# Patient Record
Sex: MALE | Race: WHITE | NOT HISPANIC OR LATINO | Employment: OTHER | URBAN - METROPOLITAN AREA
[De-identification: names, ages, dates, MRNs, and addresses within clinical notes are randomized per-mention and may not be internally consistent; named-entity substitution may affect disease eponyms.]

---

## 2020-05-28 ENCOUNTER — TELEPHONE (OUTPATIENT)
Dept: UROLOGY | Facility: CLINIC | Age: 85
End: 2020-05-28

## 2020-06-03 ENCOUNTER — TELEPHONE (OUTPATIENT)
Dept: UROLOGY | Facility: CLINIC | Age: 85
End: 2020-06-03

## 2020-06-03 NOTE — TELEPHONE ENCOUNTER
Pt returning call. Informed pt that I was trying to get him scheduled for f/u appointment. Pt verbalized understanding. Pt scheduled for 6/4

## 2020-06-04 ENCOUNTER — OFFICE VISIT (OUTPATIENT)
Dept: UROLOGY | Facility: CLINIC | Age: 85
End: 2020-06-04
Payer: MEDICARE

## 2020-06-04 VITALS
BODY MASS INDEX: 24.16 KG/M2 | HEART RATE: 82 BPM | SYSTOLIC BLOOD PRESSURE: 133 MMHG | DIASTOLIC BLOOD PRESSURE: 69 MMHG | WEIGHT: 145 LBS | HEIGHT: 65 IN | RESPIRATION RATE: 18 BRPM

## 2020-06-04 DIAGNOSIS — C61 PROSTATE CANCER: Primary | ICD-10-CM

## 2020-06-04 PROCEDURE — 99213 OFFICE O/P EST LOW 20 MIN: CPT | Mod: S$GLB,,, | Performed by: SPECIALIST

## 2020-06-04 PROCEDURE — 36415 COLL VENOUS BLD VENIPUNCTURE: CPT | Mod: S$GLB,,, | Performed by: NURSE PRACTITIONER

## 2020-06-04 PROCEDURE — 99213 PR OFFICE/OUTPT VISIT, EST, LEVL III, 20-29 MIN: ICD-10-PCS | Mod: S$GLB,,, | Performed by: SPECIALIST

## 2020-06-04 PROCEDURE — 36415 PR COLLECTION VENOUS BLOOD,VENIPUNCTURE: ICD-10-PCS | Mod: S$GLB,,, | Performed by: NURSE PRACTITIONER

## 2020-06-04 RX ORDER — ROSUVASTATIN CALCIUM 20 MG/1
20 TABLET, COATED ORAL DAILY
COMMUNITY

## 2020-06-04 RX ORDER — ASPIRIN 81 MG/1
81 TABLET ORAL DAILY
COMMUNITY

## 2020-06-04 RX ORDER — CELECOXIB 200 MG/1
200 CAPSULE ORAL
COMMUNITY

## 2020-06-04 RX ORDER — ESOMEPRAZOLE MAGNESIUM 40 MG/1
40 GRANULE, DELAYED RELEASE ORAL
COMMUNITY

## 2020-06-04 RX ORDER — AMLODIPINE BESYLATE 5 MG/1
5 TABLET ORAL DAILY
COMMUNITY

## 2020-06-04 RX ORDER — CARVEDILOL 6.25 MG/1
6.25 TABLET ORAL 2 TIMES DAILY
COMMUNITY

## 2020-06-04 RX ORDER — GLIMEPIRIDE 1 MG/1
1 TABLET ORAL
COMMUNITY

## 2020-06-04 RX ORDER — LOSARTAN POTASSIUM 100 MG/1
100 TABLET ORAL DAILY
COMMUNITY

## 2020-06-04 RX ORDER — FUROSEMIDE 20 MG/1
20 TABLET ORAL DAILY
COMMUNITY

## 2020-06-04 RX ORDER — ISOSORBIDE MONONITRATE 30 MG/1
30 TABLET, EXTENDED RELEASE ORAL DAILY
COMMUNITY

## 2020-06-04 NOTE — PROGRESS NOTES
Chief Complaint:   Chief Complaint   Patient presents with    Follow-up     6 month follow up       HPI:  84-year-old  male known to the service of Dr. Nolasco who presents for six-month follow-up prostate cancer.  He has low volume, low risk disease on active surveillance.  He was initially diagnosed on January 31, 2018 as his biopsy showed Presto 3+3=6 in 1 core, 12% of that core positive, and at that time his PSA was 5.21.  His most recent biopsy was on April 3, 2019, the biopsy results were completely negative which is a good sign.  It has been shown the one of the independent factors that determine progression on active surveillance was that patient a positive biopsy each time.  For him this is very good, because this most recent biopsy was negative.  He is due for repeat PSA today, we monitor his levels every 6 months.  He denies any new urinary complaints. No burning with urination. No hematuria.     PSA history:  10/19/2017 5.21  8/7/2018 5.98  11/20/2018 6.3 14.92%free  10/10/2019 6.64    TRUS bx  1/31/2018 +results as above  4/3/2019 Negative, prostate volume 44.16g    Allergies:  Enalapril; Felodipine; and Metformin    Medications:  has a current medication list which includes the following prescription(s): amlodipine, aspirin, carvedilol, celecoxib, esomeprazole, furosemide, glimepiride, isosorbide mononitrate, losartan, and rosuvastatin.    Review of Systems:  General: No fever, chills, vision changes, dizziness, weakness, fatigue, unexplained weight loss, confusion, or mood swings.  Skin: No rashes, itching, or changes in color/texture of skin.  Chest: Denies OVIEDO, cyanosis, wheezing, cough, and sputum production.  Abdomen: Appetite fine. Denies diarrhea, abdominal pain, hematemesis, or blood in stool.  Musculoskeletal: No joint stiffness or swelling. No painful lymph nodes.  : reviewed and negative except as stated above in the HPI.  All other review of systems negative.    PMH:   has a past  medical history of Diabetes mellitus, Hypertension, and Prostate cancer.    PSH:   has a past surgical history that includes Coronary Artery Bypass Graft (CABG); Total knee arthroplasty (Bilateral); and Toe Surgery.    FamHx: family history includes Diabetes in his father, maternal aunt, and maternal uncle; Heart disease in his father.    SocHx:  reports that he has quit smoking. His smoking use included cigarettes. He has never used smokeless tobacco. His alcohol and drug histories are not on file.      Physical Exam:  Vitals:    06/04/20 1518   BP: 133/69   Pulse: 82   Resp: 18     General: AAOx3, no apparent distress, no deformities  Neck: supple, no masses, normal thyroid, full ROM  Lungs: CTAB, no adventitious breath sounds, normal inspiration, no use of accessory muscles  Heart: regular rate and rhythm, no arrhythmias  Abdomen: soft, NT, ND, no masses, no hernias, no hepatosplenomegaly  Lymphatic: no unusually enlarged or tender lymph nodes  Skin: warm and dry, no jaundice, no rash  Ext: without edema or deformity, GRAYSON, ambulates independently with bilateral ankle braces noted  : Prostate firm, slightly larger on right side when compared to left side, smooth surface, no nodules or masses appreciated.     Labs/Studies: none    Impression/Plan:   Prostate cancer  Comments:  on active survelliance, last TRUS bx 4/3/19 which was negative, continue monitoring PSA- repeat level drawn today and will call with results  Orders:  -     PSA, total and free        Follow up in about 6 months (around 12/4/2020).

## 2020-06-05 NOTE — PROGRESS NOTES
I have seen and examined this patient clinical data was reviewed and discussed with the nurse practitioner.  I agree with her assessment her plan.    Briefly this is an 84-year-old man was diagnosed with Priya grade group 1 adenocarcinoma of the prostate in January of 2018 for elevated PSA.  In April of 2019 he underwent a repeat biopsy which was completely negative.  Since then we have been trending his PSAs every 6 months.  He has minimal bothersome lower urinary tract symptoms.    The plan today will be to obtain a serum PSA as well as complete a digital rectal exam I have patient return back to clinic in about 6 months.

## 2020-06-09 LAB
PROSTATE SPECIFIC ANTIGEN, TOTAL: 6.6 NG/ML
PSA FREE MFR SERPL: 12 % (CALC)
PSA FREE SERPL-MCNC: 0.8 NG/ML

## 2020-06-10 ENCOUNTER — TELEPHONE (OUTPATIENT)
Dept: UROLOGY | Facility: CLINIC | Age: 85
End: 2020-06-10

## 2020-06-10 NOTE — TELEPHONE ENCOUNTER
Attempted to call pt. Left voicemail. Adv callback.    ABW      ----- Message from Chrissy Weston NP sent at 6/10/2020  9:49 AM CDT -----  Known elevation, please call patient his PSA result

## 2020-06-10 NOTE — TELEPHONE ENCOUNTER
Attempted to return pt's call. Rings once then voicemail. No answer, left voicemail.    ABW    ----- Message from Zayra Carrero sent at 6/10/2020  2:52 PM CDT -----  Contact: Patient   .Type:  Test Results    Who Called: Patient   Name of Test (Lab/Mammo/Etc):  Results   Date of Test:   Ordering Provider: dr. vigil   Where the test was performed:  [facility]   Would the patient rather a call back or a response via [facility]?  Call   Best Call Back Number:  [phone]  Additional Information:  n/a

## 2020-06-11 ENCOUNTER — TELEPHONE (OUTPATIENT)
Dept: UROLOGY | Facility: CLINIC | Age: 85
End: 2020-06-11

## 2020-06-11 NOTE — TELEPHONE ENCOUNTER
Contacted pt. N/A and msg was left on VM to return call.         ----- Message from Chrissy Weston NP sent at 6/10/2020  9:49 AM CDT -----  Known elevation, please call patient his PSA result

## 2020-06-11 NOTE — TELEPHONE ENCOUNTER
Attempted to contact pt again. Left voicemail.    ABW    ----- Message from Ruthann Kessler sent at 6/11/2020  2:25 PM CDT -----  Contact: pt  Please call back missed call  [phone]

## 2020-06-11 NOTE — TELEPHONE ENCOUNTER
Call returned. Msg was left on VM to return call.   ----- Message from Dariana Jasso sent at 6/10/2020  4:00 PM CDT -----  Contact: patient  Type:  Patient Returning Call    Who Called:patient  Who Left Message for Patient:Juan lr  Does the patient know what this is regarding?:yes  Would the patient rather a call back or a response via [facility]? Call back  Best Call Back Number:[phone]  Additional Information: n/a

## 2020-06-15 ENCOUNTER — TELEPHONE (OUTPATIENT)
Dept: UROLOGY | Facility: CLINIC | Age: 85
End: 2020-06-15

## 2020-06-15 NOTE — TELEPHONE ENCOUNTER
Call returned.  Msg left on VM to return call.     ----- Message from Dariana Jasso sent at 6/15/2020  9:47 AM CDT -----  Regarding: Patient Returning a Call  Contact: patient  Type:  Patient Returning Call    Who Called:patient  Who Left Message for Patient:nurse  Does the patient know what this is regarding?:yes,results  Would the patient rather a call back or a response via [facility]? Call back  Best Call Back Number:[phone]  Additional Information: n/a

## 2020-06-16 ENCOUNTER — TELEPHONE (OUTPATIENT)
Dept: UROLOGY | Facility: CLINIC | Age: 85
End: 2020-06-16

## 2020-06-16 NOTE — TELEPHONE ENCOUNTER
Attempted to contact pt. Left message. Adv callback.    ABW    ----- Message from Deisy Lim sent at 6/16/2020  2:15 PM CDT -----  Regarding: Test Results  Contact: Pt  Type:  Patient Returning Call    Who Called:Abraham Delacruz   Who Left Message for Patient:Juan   Does the patient know what this is regarding?: test results   Would the patient rather a call back or a response via [facility]? Call back   Best Call Back Number: [phone]  Additional Information:

## 2020-06-16 NOTE — TELEPHONE ENCOUNTER
Call returned.  N/A and msg left on VM to return call.       ----- Message from Garui Glaser sent at 6/15/2020  4:55 PM CDT -----  Type:  Patient Returning Call    Who Called:Abraham Delacruz    Who Left Message for Patient: Juan with Dr. Nolasco's office  Does the patient know what this is regarding?:Test Results   Would the patient rather a call back or a response via [facility]? Call back   Best Call Back Number:[phone] (cell)   Additional Information: patient stated that he had just missed nurse's call

## 2020-06-22 ENCOUNTER — TELEPHONE (OUTPATIENT)
Dept: UROLOGY | Facility: CLINIC | Age: 85
End: 2020-06-22

## 2020-12-03 ENCOUNTER — OFFICE VISIT (OUTPATIENT)
Dept: UROLOGY | Facility: CLINIC | Age: 85
End: 2020-12-03
Payer: MEDICARE

## 2020-12-03 VITALS
DIASTOLIC BLOOD PRESSURE: 60 MMHG | BODY MASS INDEX: 24.16 KG/M2 | SYSTOLIC BLOOD PRESSURE: 126 MMHG | RESPIRATION RATE: 16 BRPM | HEART RATE: 84 BPM | WEIGHT: 145 LBS | HEIGHT: 65 IN

## 2020-12-03 DIAGNOSIS — C61 PROSTATE CANCER: Primary | ICD-10-CM

## 2020-12-03 LAB — PSA, DIAGNOSTIC: 7.33 NG/ML (ref 0–4)

## 2020-12-03 PROCEDURE — 99213 PR OFFICE/OUTPT VISIT, EST, LEVL III, 20-29 MIN: ICD-10-PCS | Mod: S$GLB,,, | Performed by: NURSE PRACTITIONER

## 2020-12-03 PROCEDURE — 99213 OFFICE O/P EST LOW 20 MIN: CPT | Mod: S$GLB,,, | Performed by: NURSE PRACTITIONER

## 2020-12-03 PROCEDURE — 36415 COLL VENOUS BLD VENIPUNCTURE: CPT | Mod: S$GLB,,, | Performed by: NURSE PRACTITIONER

## 2020-12-03 PROCEDURE — 36415 PR COLLECTION VENOUS BLOOD,VENIPUNCTURE: ICD-10-PCS | Mod: S$GLB,,, | Performed by: NURSE PRACTITIONER

## 2020-12-03 NOTE — PROGRESS NOTES
Chief Complaint:   Chief Complaint   Patient presents with    Prostate Cancer       HPI:    84-year-old  male known to the service of Dr. Nolasco who presents for six-month follow-up prostate cancer.  He has low volume, low risk disease on active surveillance.  He was initially diagnosed on January 31, 2018 as his biopsy showed Priya 3+3=6 in 1 core, 12% of that core positive, and at that time his PSA was 5.21.  His most recent biopsy was on April 3, 2019, the biopsy results were completely negative which is a good sign.  It has been shown the one of the independent factors that determine progression on active surveillance was that patient has a positive biopsy each time.  For him this is very good, because this most recent biopsy was negative.  He is due for repeat PSA today, we monitor his levels every 6 months.  Last MELVIN was performed in June 2020.  He denies any new urinary complaints. No burning with urination. No hematuria.     PSA history:  10/19/2017 5.21  8/7/2018 5.98  11/20/2018 6.3 14.92%free  10/10/2019 6.64  6/4/2020 6.6    TRUS bx  1/31/2018 +results as above  4/3/2019 Negative, prostate volume 44.16g      Allergies:  Enalapril, Felodipine, and Metformin    Medications:  has a current medication list which includes the following prescription(s): amlodipine, aspirin, carvedilol, celecoxib, esomeprazole, furosemide, glimepiride, isosorbide mononitrate, losartan, and rosuvastatin.    Review of Systems:  General: No fever, chills, vision changes, dizziness, weakness, fatigue, unexplained weight loss, confusion, or mood swings.  Skin: No rashes, itching, or changes in color/texture of skin.  Chest: Denies OVIEDO, cyanosis, wheezing, cough, and sputum production.  Abdomen: Appetite fine. Denies diarrhea, abdominal pain, hematemesis, or blood in stool.  Musculoskeletal: No joint stiffness or swelling. No painful lymph nodes.  : reviewed and negative except as stated above in the HPI.  All other  review of systems negative.    PMH:   has a past medical history of Diabetes mellitus, Hypertension, and Prostate cancer.    PSH:   has a past surgical history that includes Coronary Artery Bypass Graft (CABG); Total knee arthroplasty (Bilateral); and Toe Surgery.    FamHx: family history includes Diabetes in his father, maternal aunt, and maternal uncle; Heart disease in his father.    SocHx:  reports that he has quit smoking. His smoking use included cigarettes. He has never used smokeless tobacco. No history on file for alcohol and drug.      Physical Exam:  Vitals:    12/03/20 1521   BP: 126/60   Pulse: 84   Resp: 16     General: AAOx3, no apparent distress, no deformities  Neck: supple, no masses, normal thyroid, full ROM  Lungs: CTAB, no adventitious breath sounds, normal inspiration, no use of accessory muscles  Heart: regular rate and rhythm, no arrhythmias  Abdomen: soft, NT, ND, no masses, no hernias, no hepatosplenomegaly  Lymphatic: no unusually enlarged or tender lymph nodes  Skin: warm and dry, no jaundice, no rash  Ext: without edema or deformity, GRAYSON, ambulates independently  : deferred    Labs/Studies: none    Impression/Plan:   Prostate cancer  Comments:  on active survelliance, last TRUS bx 4/3/19 which was negative, continue monitoring PSA- repeat level drawn today and will call with results  Orders:  -     Prostate Specific Antigen, Diagnostic        Follow up in about 6 months (around 6/3/2021) for 6 month follow up prostate cancer.

## 2020-12-08 ENCOUNTER — TELEPHONE (OUTPATIENT)
Dept: UROLOGY | Facility: CLINIC | Age: 85
End: 2020-12-08

## 2020-12-08 NOTE — TELEPHONE ENCOUNTER
----- Message from Maykel Nolasco MD sent at 12/7/2020  7:55 PM CST -----  Call him and inform him that his PSA is slowly creeping up.  We might have to do a free and total.  We can repeat that in a few weeks.

## 2020-12-08 NOTE — TELEPHONE ENCOUNTER
Spoke with pt regarding elevated PSA.  Pt instructed to come for repeat PSA in 6 wks. Pt scheduled for 1/19/20 at 1:10 pm.

## 2020-12-08 NOTE — TELEPHONE ENCOUNTER
Attempted to call patient to give him his PSA result of 7.33 and to explain to him that his PSA has been slowly creeping up. I want to schedule him for a nurse visit in about 6 weeks for blood draw PSA free and total.

## 2020-12-10 ENCOUNTER — TELEPHONE (OUTPATIENT)
Dept: UROLOGY | Facility: CLINIC | Age: 85
End: 2020-12-10

## 2021-01-19 ENCOUNTER — CLINICAL SUPPORT (OUTPATIENT)
Dept: UROLOGY | Facility: CLINIC | Age: 86
End: 2021-01-19
Payer: MEDICARE

## 2021-01-19 DIAGNOSIS — C61 PROSTATE CANCER: Primary | ICD-10-CM

## 2021-01-19 PROCEDURE — 36415 PR COLLECTION VENOUS BLOOD,VENIPUNCTURE: ICD-10-PCS | Mod: S$GLB,,, | Performed by: NURSE PRACTITIONER

## 2021-01-19 PROCEDURE — 36415 COLL VENOUS BLD VENIPUNCTURE: CPT | Mod: S$GLB,,, | Performed by: NURSE PRACTITIONER

## 2021-01-22 ENCOUNTER — TELEPHONE (OUTPATIENT)
Dept: UROLOGY | Facility: CLINIC | Age: 86
End: 2021-01-22

## 2021-01-22 LAB
PROSTATE SPECIFIC ANTIGEN, TOTAL: 3.7 NG/ML
PSA FREE MFR SERPL: 14 % (CALC)
PSA FREE SERPL-MCNC: 0.5 NG/ML

## 2021-05-13 ENCOUNTER — TELEPHONE (OUTPATIENT)
Dept: UROLOGY | Facility: CLINIC | Age: 86
End: 2021-05-13

## 2021-06-02 ENCOUNTER — OFFICE VISIT (OUTPATIENT)
Dept: UROLOGY | Facility: CLINIC | Age: 86
End: 2021-06-02
Payer: MEDICARE

## 2021-06-02 VITALS
BODY MASS INDEX: 21.66 KG/M2 | SYSTOLIC BLOOD PRESSURE: 138 MMHG | HEIGHT: 65 IN | HEART RATE: 112 BPM | WEIGHT: 130 LBS | DIASTOLIC BLOOD PRESSURE: 64 MMHG

## 2021-06-02 DIAGNOSIS — C61 PROSTATE CANCER: Primary | ICD-10-CM

## 2021-06-02 LAB — PSA, DIAGNOSTIC: 7.29 NG/ML (ref 0–4)

## 2021-06-02 PROCEDURE — 36415 PR COLLECTION VENOUS BLOOD,VENIPUNCTURE: ICD-10-PCS | Mod: S$GLB,,, | Performed by: NURSE PRACTITIONER

## 2021-06-02 PROCEDURE — 99213 OFFICE O/P EST LOW 20 MIN: CPT | Mod: S$GLB,,, | Performed by: NURSE PRACTITIONER

## 2021-06-02 PROCEDURE — 99213 PR OFFICE/OUTPT VISIT, EST, LEVL III, 20-29 MIN: ICD-10-PCS | Mod: S$GLB,,, | Performed by: NURSE PRACTITIONER

## 2021-06-02 PROCEDURE — 36415 COLL VENOUS BLD VENIPUNCTURE: CPT | Mod: S$GLB,,, | Performed by: NURSE PRACTITIONER

## 2021-06-02 RX ORDER — LANOLIN ALCOHOL/MO/W.PET/CERES
400 CREAM (GRAM) TOPICAL DAILY
COMMUNITY

## 2021-06-02 RX ORDER — FLUOCINONIDE 0.5 MG/G
CREAM TOPICAL
COMMUNITY
Start: 2021-04-08

## 2021-06-07 ENCOUNTER — TELEPHONE (OUTPATIENT)
Dept: UROLOGY | Facility: CLINIC | Age: 86
End: 2021-06-07

## 2021-12-06 ENCOUNTER — CLINICAL SUPPORT (OUTPATIENT)
Dept: UROLOGY | Facility: CLINIC | Age: 86
End: 2021-12-06
Payer: MEDICARE

## 2021-12-06 DIAGNOSIS — C61 PROSTATE CANCER: Primary | ICD-10-CM

## 2021-12-06 LAB — PSA, DIAGNOSTIC: 6.59 NG/ML (ref 0–4)

## 2021-12-14 ENCOUNTER — OFFICE VISIT (OUTPATIENT)
Dept: UROLOGY | Facility: CLINIC | Age: 86
End: 2021-12-14
Payer: MEDICARE

## 2021-12-14 VITALS — HEART RATE: 88 BPM | SYSTOLIC BLOOD PRESSURE: 141 MMHG | DIASTOLIC BLOOD PRESSURE: 71 MMHG

## 2021-12-14 DIAGNOSIS — C61 PROSTATE CANCER: Primary | ICD-10-CM

## 2021-12-14 PROCEDURE — 99214 PR OFFICE/OUTPT VISIT, EST, LEVL IV, 30-39 MIN: ICD-10-PCS | Mod: S$GLB,,, | Performed by: UROLOGY

## 2021-12-14 PROCEDURE — 99214 OFFICE O/P EST MOD 30 MIN: CPT | Mod: S$GLB,,, | Performed by: UROLOGY

## 2022-06-14 ENCOUNTER — CLINICAL SUPPORT (OUTPATIENT)
Dept: UROLOGY | Facility: CLINIC | Age: 87
End: 2022-06-14
Payer: MEDICARE

## 2022-06-14 DIAGNOSIS — C61 PROSTATE CANCER: Primary | ICD-10-CM

## 2022-06-14 LAB — PSA, DIAGNOSTIC: 8.12 NG/ML (ref 0–4)
